# Patient Record
Sex: FEMALE | Race: OTHER | Employment: STUDENT | URBAN - METROPOLITAN AREA
[De-identification: names, ages, dates, MRNs, and addresses within clinical notes are randomized per-mention and may not be internally consistent; named-entity substitution may affect disease eponyms.]

---
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
## 2023-02-09 ENCOUNTER — HOSPITAL ENCOUNTER (EMERGENCY)
Age: 23
Discharge: PSYCHIATRIC OR SUBSTANCE USE FACILITY | End: 2023-02-10
Attending: EMERGENCY MEDICINE
Payer: MEDICAID

## 2023-02-09 DIAGNOSIS — R45.851 SUICIDAL IDEATION: Primary | ICD-10-CM

## 2023-02-09 LAB
ACETAMINOPHEN LEVEL: <5 UG/ML (ref 15–30)
ALBUMIN SERPL-MCNC: 4.3 GM/DL (ref 3.4–5)
ALCOHOL SCREEN SERUM: <0.01 %WT/VOL
ALP BLD-CCNC: 83 IU/L (ref 40–129)
ALT SERPL-CCNC: 52 U/L (ref 10–40)
AMPHETAMINES: NEGATIVE
ANION GAP SERPL CALCULATED.3IONS-SCNC: 13 MMOL/L (ref 4–16)
AST SERPL-CCNC: 36 IU/L (ref 15–37)
BARBITURATE SCREEN URINE: NEGATIVE
BASOPHILS ABSOLUTE: 0 K/CU MM
BASOPHILS RELATIVE PERCENT: 0.5 % (ref 0–1)
BENZODIAZEPINE SCREEN, URINE: NEGATIVE
BILIRUB SERPL-MCNC: 0.3 MG/DL (ref 0–1)
BUN SERPL-MCNC: 13 MG/DL (ref 6–23)
CALCIUM SERPL-MCNC: 9.9 MG/DL (ref 8.3–10.6)
CANNABINOID SCREEN URINE: NEGATIVE
CHLORIDE BLD-SCNC: 104 MMOL/L (ref 99–110)
CO2: 24 MMOL/L (ref 21–32)
COCAINE METABOLITE: NEGATIVE
CREAT SERPL-MCNC: 0.7 MG/DL (ref 0.6–1.1)
DIFFERENTIAL TYPE: ABNORMAL
DOSE AMOUNT: ABNORMAL
DOSE AMOUNT: ABNORMAL
DOSE TIME: ABNORMAL
DOSE TIME: ABNORMAL
EOSINOPHILS ABSOLUTE: 0.3 K/CU MM
EOSINOPHILS RELATIVE PERCENT: 3.8 % (ref 0–3)
GFR SERPL CREATININE-BSD FRML MDRD: >60 ML/MIN/1.73M2
GLUCOSE SERPL-MCNC: 130 MG/DL (ref 70–99)
HCG QUALITATIVE: NEGATIVE
HCT VFR BLD CALC: 47.4 % (ref 37–47)
HEMOGLOBIN: 14.7 GM/DL (ref 12.5–16)
IMMATURE NEUTROPHIL %: 0.4 % (ref 0–0.43)
LYMPHOCYTES ABSOLUTE: 2.9 K/CU MM
LYMPHOCYTES RELATIVE PERCENT: 34.4 % (ref 24–44)
MCH RBC QN AUTO: 27 PG (ref 27–31)
MCHC RBC AUTO-ENTMCNC: 31 % (ref 32–36)
MCV RBC AUTO: 87.1 FL (ref 78–100)
MONOCYTES ABSOLUTE: 0.5 K/CU MM
MONOCYTES RELATIVE PERCENT: 5.7 % (ref 0–4)
NUCLEATED RBC %: 0 %
OPIATES, URINE: NEGATIVE
OXYCODONE: NEGATIVE
PDW BLD-RTO: 13.7 % (ref 11.7–14.9)
PHENCYCLIDINE, URINE: NEGATIVE
PLATELET # BLD: 303 K/CU MM (ref 140–440)
PMV BLD AUTO: 10.6 FL (ref 7.5–11.1)
POTASSIUM SERPL-SCNC: 4.1 MMOL/L (ref 3.5–5.1)
RBC # BLD: 5.44 M/CU MM (ref 4.2–5.4)
SALICYLATE LEVEL: <0.3 MG/DL (ref 15–30)
SEGMENTED NEUTROPHILS ABSOLUTE COUNT: 4.7 K/CU MM
SEGMENTED NEUTROPHILS RELATIVE PERCENT: 55.2 % (ref 36–66)
SODIUM BLD-SCNC: 141 MMOL/L (ref 135–145)
TOTAL IMMATURE NEUTOROPHIL: 0.03 K/CU MM
TOTAL NUCLEATED RBC: 0 K/CU MM
TOTAL PROTEIN: 7.6 GM/DL (ref 6.4–8.2)
WBC # BLD: 8.4 K/CU MM (ref 4–10.5)

## 2023-02-09 PROCEDURE — G0480 DRUG TEST DEF 1-7 CLASSES: HCPCS

## 2023-02-09 PROCEDURE — 80307 DRUG TEST PRSMV CHEM ANLYZR: CPT

## 2023-02-09 PROCEDURE — 87804 INFLUENZA ASSAY W/OPTIC: CPT

## 2023-02-09 PROCEDURE — 84703 CHORIONIC GONADOTROPIN ASSAY: CPT

## 2023-02-09 PROCEDURE — 85025 COMPLETE CBC W/AUTO DIFF WBC: CPT

## 2023-02-09 PROCEDURE — 80053 COMPREHEN METABOLIC PANEL: CPT

## 2023-02-09 PROCEDURE — 99285 EMERGENCY DEPT VISIT HI MDM: CPT

## 2023-02-09 PROCEDURE — 87635 SARS-COV-2 COVID-19 AMP PRB: CPT

## 2023-02-10 VITALS
RESPIRATION RATE: 21 BRPM | BODY MASS INDEX: 50.02 KG/M2 | HEART RATE: 102 BPM | SYSTOLIC BLOOD PRESSURE: 137 MMHG | TEMPERATURE: 98.1 F | HEIGHT: 64 IN | WEIGHT: 293 LBS | OXYGEN SATURATION: 97 % | DIASTOLIC BLOOD PRESSURE: 90 MMHG

## 2023-02-10 LAB
RAPID INFLUENZA  B AGN: NEGATIVE
RAPID INFLUENZA A AGN: NEGATIVE
SARS-COV-2 RDRP RESP QL NAA+PROBE: NOT DETECTED
SOURCE: NORMAL

## 2023-02-10 ASSESSMENT — PAIN - FUNCTIONAL ASSESSMENT: PAIN_FUNCTIONAL_ASSESSMENT: NONE - DENIES PAIN

## 2023-02-10 NOTE — ED PROVIDER NOTES
Emergency Department Encounter  [facility] EMERGENCY DEPARTMENT    Patient: Ivon Ely  MRN: [id number]  : 2000  Date of Evaluation: 2023  ED Provider: Janis Chan PA-C    Chief Complaint       Chief Complaint   Patient presents with    Suicidal       Kake     Ivon Ely is a 23 y.o. female who presents to the emergency department for hallucinations, paranoia, suicidal ideation.  Patient states she stopped taking her oral Abilify about 10 days ago because she doesn't like to take pills and doesn't like the side effects.  She states she did much better when she was on IM Abilify but was taken off that when she moved to [place] a couple years ago.  She states she believes people are following her and out to get her.  She has a plan to drive to the middle of [place] and overdose on all of her pills.  Denies any homicidal ideation.  Denies drug or alcohol use.       ROS     CONSTITUTIONAL:  Denies fever.  EYES:  Denies visual changes.  HEAD:  Denies headache.  ENT:  Denies earache, nasal congestion, sore throat.  NECK:  Denies neck pain.  RESPIRATORY:  Denies any shortness of breath.  CARDIOVASCULAR:  Denies chest pain.  GI:  Denies nausea or vomiting.    :  Denies urinary symptoms.  MUSCULOSKELETAL:  Denies extremity pain or swelling.  BACK:  Denies back pain.  INTEGUMENT:  Denies skin changes.  LYMPHATIC:  Denies lymphadenopathy.  NEUROLOGIC:  Denies any numbness/tingling.  PSYCHIATRIC:  + hallucinations, paranoia, SI    Past History     Past Medical History:   Diagnosis Date    Depression      History reviewed. No pertinent surgical history.  Social History     Socioeconomic History    Marital status: Single     Spouse name: None    Number of children: None    Years of education: None    Highest education level: None   Tobacco Use    Smoking status: Never    Smokeless tobacco: Never   Substance and Sexual Activity    Alcohol use: No    Drug  use: No    Sexual activity: Never       Medications/Allergies     Discharge Medication List as of 2/10/2023  8:35 AM        CONTINUE these medications which have NOT CHANGED    Details   sertraline (ZOLOFT) 50 MG tablet Take 50 mg by mouth dailyHistorical Med      ARIPiprazole (ABILIFY) 5 MG tablet Take 10 mg by mouth dailyHistorical Med           No Known Allergies     Physical Exam       ED Triage Vitals [02/09/23 2114]   BP Temp Temp src Heart Rate Resp SpO2 Height Weight   (!) 156/90 98.2 °F (36.8 °C) -- (!) 128 (!) 32 98 % -- --     GENERAL APPEARANCE:  Well-developed, well-nourished, no acute distress. HEAD:  NC/AT. EYES:  Sclera anicteric. ENT:  Ears, nose, mouth normal.     NECK:  Supple. CARDIO:  RRR. LUNGS:   CTAB. Respirations unlabored. ABDOMEN:  Soft, non-distended, non-tender. BS active. BACK:  No midline thoracic or lumbar spinal tenderness. EXTREMITIES:  No acute deformities. SKIN:  Warm and dry. NEUROLOGICAL:  Alert and oriented. PSYCHIATRIC:  Normal mood. Diagnostics     Labs:  Results for orders placed or performed during the hospital encounter of 02/09/23   COVID-19, Rapid    Specimen: Nasopharyngeal   Result Value Ref Range    Source UNKNOWN     SARS-CoV-2, NAAT NOT DETECTED NOT DETECTED   Rapid Flu Swab    Specimen: Nasopharyngeal   Result Value Ref Range    Rapid Influenza A Ag NEGATIVE NEGATIVE    Rapid Influenza B Ag NEGATIVE NEGATIVE   Ethanol   Result Value Ref Range    Alcohol Scrn <0.01 <8.00 %WT/VOL   Salicylate   Result Value Ref Range    Salicylate Lvl <8.0 (L) 15 - 30 MG/DL    DOSE AMOUNT DOSE AMT. GIVEN - Unknown     DOSE TIME DOSE TIME GIVEN - Unknown    Acetaminophen Level   Result Value Ref Range    Acetaminophen Level <5.0 (L) 15 - 30 ug/ml    DOSE AMOUNT DOSE AMT.  GIVEN - UNKNOWN     DOSE TIME DOSE TIME GIVEN - UNKNOWN    Urine Drug Screen   Result Value Ref Range    Cannabinoid Scrn, Ur NEGATIVE NEGATIVE    Amphetamines NEGATIVE NEGATIVE    Cocaine Metabolite NEGATIVE NEGATIVE    Benzodiazepine Screen, Urine NEGATIVE NEGATIVE    Barbiturate Screen, Ur NEGATIVE NEGATIVE    Opiates, Urine NEGATIVE NEGATIVE    Phencyclidine, Urine NEGATIVE NEGATIVE    Oxycodone NEGATIVE NEGATIVE   CBC with Auto Differential   Result Value Ref Range    WBC 8.4 4.0 - 10.5 K/CU MM    RBC 5.44 (H) 4.2 - 5.4 M/CU MM    Hemoglobin 14.7 12.5 - 16.0 GM/DL    Hematocrit 47.4 (H) 37 - 47 %    MCV 87.1 78 - 100 FL    MCH 27.0 27 - 31 PG    MCHC 31.0 (L) 32.0 - 36.0 %    RDW 13.7 11.7 - 14.9 %    Platelets 083 094 - 219 K/CU MM    MPV 10.6 7.5 - 11.1 FL    Differential Type AUTOMATED DIFFERENTIAL     Segs Relative 55.2 36 - 66 %    Lymphocytes % 34.4 24 - 44 %    Monocytes % 5.7 (H) 0 - 4 %    Eosinophils % 3.8 (H) 0 - 3 %    Basophils % 0.5 0 - 1 %    Segs Absolute 4.7 K/CU MM    Lymphocytes Absolute 2.9 K/CU MM    Monocytes Absolute 0.5 K/CU MM    Eosinophils Absolute 0.3 K/CU MM    Basophils Absolute 0.0 K/CU MM    Nucleated RBC % 0.0 %    Total Nucleated RBC 0.0 K/CU MM    Total Immature Neutrophil 0.03 K/CU MM    Immature Neutrophil % 0.4 0 - 0.43 %   Comprehensive Metabolic Panel w/ Reflex to MG   Result Value Ref Range    Sodium 141 135 - 145 MMOL/L    Potassium 4.1 3.5 - 5.1 MMOL/L    Chloride 104 99 - 110 mMol/L    CO2 24 21 - 32 MMOL/L    BUN 13 6 - 23 MG/DL    Creatinine 0.7 0.6 - 1.1 MG/DL    Est, Glom Filt Rate >60 >60 mL/min/1.73m2    Glucose 130 (H) 70 - 99 MG/DL    Calcium 9.9 8.3 - 10.6 MG/DL    Albumin 4.3 3.4 - 5.0 GM/DL    Total Protein 7.6 6.4 - 8.2 GM/DL    Total Bilirubin 0.3 0.0 - 1.0 MG/DL    ALT 52 (H) 10 - 40 U/L    AST 36 15 - 37 IU/L    Alkaline Phosphatase 83 40 - 129 IU/L    Anion Gap 13 4 - 16   HCG Qualitative, Serum   Result Value Ref Range    hCG Qual NEGATIVE        Radiographs:  No results found. Procedures/EKG:   Please see attending physician's note for interpretation.     ED Course and MDM     Chief Complaint/HPI Summary/Differential Diagnosis: Patient presents to the ED with chief complaint of hallucinations, paranoia, and suicidal ideation. Patient seen and evaluated. Triage and nursing notes reviewed and incorporated. History from : Patient    Limitations to history : None    Patient was given the following medications:  Medications - No data to display    Independent Imaging Interpretation by me:  None    EKG (if obtained):  Please see supervising physician's note for interpretation. Chronic conditions affecting care: Depression    Discussion with Other Profesionals : Consultant Dr. Neena Germain, psychiatry    Social Determinants : None    Records Reviewed : Inpatient Notes from similar encounters    ED Course/Reassessment/Disposition:  Patient seen and examined. Screening labs obtained. Results were discussed with patient--she is medically cleared. Consult placed to psychiatry. Patient evaluated by Dr. Neena Germain who recommends inpatient management. Pending placement at the end of my shift. Signed out to Dr. Yefri Angel. Disposition Considerations (tests considered but not done, Shared Decision Making, Patient Expectation of Test or Treatment):   Psychiatry recommends inpatient placement. I am the Primary Clinician of Record. Supervising physician was Dr. Yefri Angel. Patient signed out to him at the end of my shift. In light of current events, I did utilize appropriate PPE (including N95 and surgical face mask, safety glasses, and gloves, as recommended by the health facility/national standard best practice, during my bedside interactions with the patient. Final Impression      1.  Suicidal ideation          DISPOSITION Decision To Transfer 02/10/2023 05:00:02 AM      [address]an[address], PA-C   Ac[facility]        [address] [place], [place]  02/10/23 7216

## 2023-02-10 NOTE — ED NOTES
Report called to [facility].      [facility]  02/10/23 3593

## 2023-02-10 NOTE — ED NOTES
Patient resting in position of comfort, 1:1 sitter at bedside with patient.         Sean Dunlap RN  02/09/23 6248
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
## 2023-02-10 NOTE — ED NOTES
Pt updated on plan of care, awaiting lab results, denies needs, 1:1 sitter at bedside.         Franco Kat RN  02/09/23 5600

## 2023-02-10 NOTE — ED NOTES
Report received from [facility]. Care assumed at this time.       [facility]  02/10/23 3531

## 2023-02-10 NOTE — ED NOTES
Patient arrives to ED  for suicidal ideations that have gone on for the last month and have been increasing in the last few days. Patient states \" my plan is just to take a bunch of pills. \"  Patient is alert and oriented at time of arrival.     Tawanda Hutchinson RN  02/09/23 8024

## 2023-02-10 NOTE — ED PROVIDER NOTES
CARE RECEIVED FROM: GAYE Chan  I reviewed the tyson elements of the history, physical exam and initial treatment plan at the bedside. ANCILLARY DATA:  I reviewed the images. Radiologist interpretation:   No orders to display     Labs Reviewed   SALICYLATE LEVEL - Abnormal; Notable for the following components:       Result Value    Salicylate Lvl <9.2 (*)     All other components within normal limits   ACETAMINOPHEN LEVEL - Abnormal; Notable for the following components:    Acetaminophen Level <5.0 (*)     All other components within normal limits   CBC WITH AUTO DIFFERENTIAL - Abnormal; Notable for the following components:    RBC 5.44 (*)     Hematocrit 47.4 (*)     MCHC 31.0 (*)     Monocytes % 5.7 (*)     Eosinophils % 3.8 (*)     All other components within normal limits   COMPREHENSIVE METABOLIC PANEL W/ REFLEX TO MG FOR LOW K - Abnormal; Notable for the following components:    Glucose 130 (*)     ALT 52 (*)     All other components within normal limits   COVID-19, RAPID   RAPID INFLUENZA A/B ANTIGENS   ETHANOL   URINE DRUG SCREEN   HCG, SERUM, QUALITATIVE     MEDICAL DECISION MAKING / PLAN:    This is a 32-year-old female who presented the emergency department complaints of paranoia and suicidal ideation with plans to drive [place] the [place] of nowhere and overdose on pills. It seems that she stopped taking her Abilify that was prescribed to her. Her medical screening exam was reassuring here and patient medically cleared. She was evaluated by psychiatry with recommendations for inpatient psychiatric care. At time of signout patient is awaiting psych placement. [facility]. FINAL IMPRESSION:  1. Suicidal ideation      ? Electronically signed by:  [address], DO, 2/10/2023        [address], DO  02/10/23 0500

## 2023-02-10 NOTE — ED NOTES
Pt accepted by  at [facility]  9th floor  [id number] [phone]  Can come after [phone]     Lety Miguel  02/10/23 5564

## 2023-02-10 NOTE — ED NOTES
[facility] at bedside for transport.       [facility]  02/10/23 0287

## 2023-02-10 NOTE — ED NOTES
1:1 sitter at bedside, jorge a reports feeling suicidal states that she wanted to overdose, patient ambulated to restroom with 2 staff members to provide urine sample.    Patient consented to labs and urine as well as covid and flu        Gareth Fuchs RN  02/09/23 3121

## 2024-05-07 ENCOUNTER — HOSPITAL ENCOUNTER (OUTPATIENT)
Age: 24
Discharge: HOME | End: 2024-05-07
Payer: MEDICAID

## 2024-05-07 ENCOUNTER — OFFICE VISIT (OUTPATIENT)
Dept: BARIATRICS/WEIGHT MGMT | Age: 24
End: 2024-05-07
Payer: MEDICAID

## 2024-05-07 VITALS
WEIGHT: 293 LBS | SYSTOLIC BLOOD PRESSURE: 130 MMHG | DIASTOLIC BLOOD PRESSURE: 80 MMHG | OXYGEN SATURATION: 97 % | HEIGHT: 65 IN | HEART RATE: 90 BPM | BODY MASS INDEX: 48.82 KG/M2

## 2024-05-07 DIAGNOSIS — Z01.818 PRE-OPERATIVE CLEARANCE: ICD-10-CM

## 2024-05-07 DIAGNOSIS — Z79.899 MEDICATION MANAGEMENT: Primary | ICD-10-CM

## 2024-05-07 DIAGNOSIS — Z01.818 PRE-OPERATIVE CLEARANCE: Primary | ICD-10-CM

## 2024-05-07 DIAGNOSIS — E66.01 MORBID OBESITY DUE TO EXCESS CALORIES (HCC): ICD-10-CM

## 2024-05-07 DIAGNOSIS — Z79.899 MEDICATION MANAGEMENT: ICD-10-CM

## 2024-05-07 LAB
25(OH)D3 SERPL-MCNC: 14.03 NG/ML
ALBUMIN SERPL-MCNC: 4 GM/DL (ref 3.4–5)
ALP BLD-CCNC: 88 IU/L (ref 40–129)
ALT SERPL-CCNC: 25 U/L (ref 10–40)
ANION GAP SERPL CALCULATED.3IONS-SCNC: 12 MMOL/L (ref 7–16)
AST SERPL-CCNC: 17 IU/L (ref 15–37)
BASOPHILS ABSOLUTE: 0 K/CU MM
BASOPHILS RELATIVE PERCENT: 0.4 % (ref 0–1)
BILIRUB SERPL-MCNC: 0.4 MG/DL (ref 0–1)
BUN SERPL-MCNC: 15 MG/DL (ref 6–23)
CALCIUM SERPL-MCNC: 8.8 MG/DL (ref 8.3–10.6)
CHLORIDE BLD-SCNC: 106 MMOL/L (ref 99–110)
CHOLEST SERPL-MCNC: 182 MG/DL
CO2: 23 MMOL/L (ref 21–32)
CREAT SERPL-MCNC: 0.6 MG/DL (ref 0.6–1.1)
DIFFERENTIAL TYPE: ABNORMAL
EKG ATRIAL RATE: 83 BPM
EKG DIAGNOSIS: NORMAL
EKG P AXIS: 70 DEGREES
EKG P-R INTERVAL: 168 MS
EKG Q-T INTERVAL: 354 MS
EKG QRS DURATION: 70 MS
EKG QTC CALCULATION (BAZETT): 415 MS
EKG R AXIS: 82 DEGREES
EKG T AXIS: 49 DEGREES
EKG VENTRICULAR RATE: 83 BPM
EOSINOPHILS ABSOLUTE: 0.4 K/CU MM
EOSINOPHILS RELATIVE PERCENT: 5.3 % (ref 0–3)
ESTIMATED AVERAGE GLUCOSE: 105 MG/DL
FOLATE SERPL-MCNC: 11.1 NG/ML (ref 3.1–17.5)
GFR, ESTIMATED: >90 ML/MIN/1.73M2
GLUCOSE SERPL-MCNC: 86 MG/DL (ref 70–99)
HBA1C MFR BLD: 5.3 % (ref 4.2–6.3)
HCT VFR BLD CALC: 44.7 % (ref 37–47)
HDLC SERPL-MCNC: 38 MG/DL
HEMOGLOBIN: 14.2 GM/DL (ref 12.5–16)
IMMATURE NEUTROPHIL %: 0.3 % (ref 0–0.43)
IRON: 77 UG/DL (ref 37–145)
LDLC SERPL CALC-MCNC: 102 MG/DL
LYMPHOCYTES ABSOLUTE: 2.5 K/CU MM
LYMPHOCYTES RELATIVE PERCENT: 31.6 % (ref 24–44)
MAGNESIUM: 2.1 MG/DL (ref 1.8–2.4)
MCH RBC QN AUTO: 28.1 PG (ref 27–31)
MCHC RBC AUTO-ENTMCNC: 31.8 % (ref 32–36)
MCV RBC AUTO: 88.5 FL (ref 78–100)
MONOCYTES ABSOLUTE: 0.4 K/CU MM
MONOCYTES RELATIVE PERCENT: 5.6 % (ref 0–4)
NEUTROPHILS ABSOLUTE: 4.5 K/CU MM
NEUTROPHILS RELATIVE PERCENT: 56.8 % (ref 36–66)
NUCLEATED RBC %: 0 %
PCT TRANSFERRIN: 22 % (ref 10–44)
PDW BLD-RTO: 13.3 % (ref 11.7–14.9)
PLATELET # BLD: 257 K/CU MM (ref 140–440)
PMV BLD AUTO: 11.1 FL (ref 7.5–11.1)
POTASSIUM SERPL-SCNC: 4.5 MMOL/L (ref 3.5–5.1)
RBC # BLD: 5.05 M/CU MM (ref 4.2–5.4)
SODIUM BLD-SCNC: 141 MMOL/L (ref 135–145)
TOTAL IMMATURE NEUTOROPHIL: 0.02 K/CU MM
TOTAL IRON BINDING CAPACITY: 353 UG/DL (ref 250–450)
TOTAL NUCLEATED RBC: 0 K/CU MM
TOTAL PROTEIN: 6.6 GM/DL (ref 6.4–8.2)
TRIGL SERPL-MCNC: 208 MG/DL
TSH SERPL DL<=0.005 MIU/L-ACNC: 2.68 UIU/ML (ref 0.27–4.2)
UNSATURATED IRON BINDING CAPACITY: 276 UG/DL (ref 110–370)
VITAMIN B-12: 266.3 PG/ML (ref 211–911)
WBC # BLD: 7.9 K/CU MM (ref 4–10.5)

## 2024-05-07 PROCEDURE — 83735 ASSAY OF MAGNESIUM: CPT

## 2024-05-07 PROCEDURE — 82306 VITAMIN D 25 HYDROXY: CPT

## 2024-05-07 PROCEDURE — 82746 ASSAY OF FOLIC ACID SERUM: CPT

## 2024-05-07 PROCEDURE — 84630 ASSAY OF ZINC: CPT

## 2024-05-07 PROCEDURE — 99204 OFFICE O/P NEW MOD 45 MIN: CPT | Performed by: SURGERY

## 2024-05-07 PROCEDURE — 93005 ELECTROCARDIOGRAM TRACING: CPT | Performed by: SURGERY

## 2024-05-07 PROCEDURE — 85025 COMPLETE CBC W/AUTO DIFF WBC: CPT

## 2024-05-07 PROCEDURE — 93010 ELECTROCARDIOGRAM REPORT: CPT | Performed by: INTERNAL MEDICINE

## 2024-05-07 PROCEDURE — 84443 ASSAY THYROID STIM HORMONE: CPT

## 2024-05-07 PROCEDURE — 82607 VITAMIN B-12: CPT

## 2024-05-07 PROCEDURE — 80061 LIPID PANEL: CPT

## 2024-05-07 PROCEDURE — 81025 URINE PREGNANCY TEST: CPT | Performed by: SURGERY

## 2024-05-07 PROCEDURE — 36415 COLL VENOUS BLD VENIPUNCTURE: CPT

## 2024-05-07 PROCEDURE — 80053 COMPREHEN METABOLIC PANEL: CPT

## 2024-05-07 PROCEDURE — 83036 HEMOGLOBIN GLYCOSYLATED A1C: CPT

## 2024-05-07 PROCEDURE — 83970 ASSAY OF PARATHORMONE: CPT

## 2024-05-07 PROCEDURE — 83550 IRON BINDING TEST: CPT

## 2024-05-07 PROCEDURE — 83540 ASSAY OF IRON: CPT

## 2024-05-07 ASSESSMENT — ENCOUNTER SYMPTOMS
GASTROINTESTINAL NEGATIVE: 1
BACK PAIN: 1
SHORTNESS OF BREATH: 1
ALLERGIC/IMMUNOLOGIC NEGATIVE: 1
EYES NEGATIVE: 1

## 2024-05-07 NOTE — PROGRESS NOTES
Initial Bariatric Surgery Consultation History and Physical    Chief Complaint: Obesity Body mass index is 69.56 kg/m².    History of Present Illness:  The patient is a 24 y.o. female being seen today for initial bariatric surgery consultation.    The patient previously attended a bariatric surgery informational seminar or received electronic version of presentation.    The patient's PCP is doesn't have one.    The patient first recognized having issues with increased weight approximately \"my whole life\" years ago.     The patient identifies the following precipitants causing, or contributing to, weight gain: starting medications for bipolar    The patient estimates the lowest weight in the past five years is approximately 290 pounds.    The patient estimates the highest weight in the past five years is approximately 418 pounds.    The patient's current weight is 418 pounds, while the current BMI is Body mass index is 69.56 kg/m².    The patient has not had previous bariatric surgery.    The patient has tried the following diet(s): water fasting, multiple diets    The patient has tried the following over-the-counter drugs and/or prescription weight loss medications: hydroxycut    The patient has tried the following physical activities or exercises: denies    The patient was mostly unsuccessful with previous dietary, medication, and/or physical activity for sustained weight loss.    The patient reports current level of commitment to weight loss as: 100 percent    In reviewing the patient's typical daily diet, it consists of the following:   Breakfast: breakfast burrito from [employer]'s   Snack: denies   Lunch: ramen   Snack: denies   Dinner: fried meat with sauce and rice   Snack: ramen    The patient reports drinking the following beverages throughout the day: water, soda, flavored water packets    The patient reports eating outside of the house approximately 1 times per week at either fast food or sit down

## 2024-05-08 LAB — PTH-INTACT SERPL-MCNC: 44 PG/ML (ref 15–65)

## 2024-05-09 LAB — ZINC SERPL-MCNC: 75.7 UG/DL (ref 60–120)

## 2024-06-06 ENCOUNTER — OFFICE VISIT (OUTPATIENT)
Dept: BARIATRICS/WEIGHT MGMT | Age: 24
End: 2024-06-06

## 2024-06-06 VITALS
DIASTOLIC BLOOD PRESSURE: 72 MMHG | SYSTOLIC BLOOD PRESSURE: 128 MMHG | BODY MASS INDEX: 48.82 KG/M2 | HEIGHT: 65 IN | HEART RATE: 101 BPM | WEIGHT: 293 LBS | OXYGEN SATURATION: 97 %

## 2024-06-06 VITALS — BODY MASS INDEX: 48.82 KG/M2 | HEIGHT: 65 IN | WEIGHT: 293 LBS

## 2024-06-06 DIAGNOSIS — E66.01 CLASS 3 SEVERE OBESITY DUE TO EXCESS CALORIES WITH BODY MASS INDEX (BMI) OF 60.0 TO 69.9 IN ADULT, UNSPECIFIED WHETHER SERIOUS COMORBIDITY PRESENT (HCC): Primary | ICD-10-CM

## 2024-06-06 DIAGNOSIS — E66.01 MORBID OBESITY WITH BMI OF 60.0-69.9, ADULT (HCC): Primary | ICD-10-CM

## 2024-06-06 PROCEDURE — NBSRV NON-BILLABLE SERVICE: Performed by: SURGERY

## 2024-06-06 NOTE — PROGRESS NOTES
Obesity   Problem: Increased adiposity compared to reference standard or established norms   Etiology: Excess intake compared to output over time   S/S: Ht: 5' 5\" Wt: 416 lb 10.7 oz BMI: 69.34    NUTRITION INTERVENTIONS:    Individualized treatment goals to address nutrition diagnosis:   Instructed on 1200 kcal diet for weight loss   Provided sample menus, healthy foods list, plate models and meal planning handouts   Encouraged record keeping and physical activity as approved by physician    MONITORING/ EVALUATION/ PLAN:   Pt verbalized understanding of all materials covered   Pt asked pertinent questions throughout the session - expect compliance with nutrition guidelines presented   Provided pt with contact information should questions arise prior to next visit   Will f/u with pt in 2-3 mos for further education and post-op diet instructions    Hernandez Carter, RD, LD

## 2024-06-06 NOTE — PROGRESS NOTES
Ivon Ely  2000  24 y.o.    SUBJECT MAGDI:  HPI: Ivon Ely presents today for Initial Surgical Weight Loss Class instructed by this provider and registered dietician. Patient had an initial individual consult with Dr. Mendenhall for surgical weight loss management and agrees to follow the program guidelines.      No chief complaint on file.   Education Class  Past Medical History:   Diagnosis Date    Depression      No past surgical history on file.  No current outpatient medications on file.     No current facility-administered medications for this visit.     No family history on file.  No Known Allergies     OBJECTIVE:     /72 (Site: Left Lower Arm, Position: Sitting, Cuff Size: Medium Adult)   Pulse (!) 101   Ht 1.651 m (5' 5\")   Wt (!) 189 kg (416 lb 11.2 oz)   SpO2 97%   BMI 69.34 kg/m²   Wt Readings from Last 3 Encounters:   06/06/24 (!) 189 kg (416 lb 11.2 oz)   05/07/24 (!) 189.6 kg (418 lb)   02/10/23 (!) 158.8 kg (350 lb)       - Patient attended the Surgical Weight Loss Group class today.     - Instructed on risks of obesity, criteria for surgical intervention, advantages of weight loss surgery and the program requirements.    - Also instructed on mindful eating, label reading, calorie counting, healthy food choices, and goal setting.    - Patient was provided a foods list, meal plan, and goal card.    - Patient asked pertinent questions throughout session and answers given.    - Patient voiced understanding of all information provided.    - Patient is to refer to surgical handbook or to call office if questions arise.     - Goal card signed.    Patient was seen with total face to face time of 40 minutes in a group setting. More than 50% of this visit was counseling on obesity, strict diet recommendations, nutrition and education as above in my assessment and plan section of my note.

## 2024-06-18 ENCOUNTER — TELEPHONE (OUTPATIENT)
Dept: CARDIOLOGY CLINIC | Age: 24
End: 2024-06-18

## 2024-06-18 ENCOUNTER — OFFICE VISIT (OUTPATIENT)
Dept: BARIATRICS/WEIGHT MGMT | Age: 24
End: 2024-06-18
Payer: MEDICAID

## 2024-06-18 VITALS
DIASTOLIC BLOOD PRESSURE: 62 MMHG | HEIGHT: 65 IN | WEIGHT: 293 LBS | SYSTOLIC BLOOD PRESSURE: 124 MMHG | HEART RATE: 90 BPM | OXYGEN SATURATION: 98 % | BODY MASS INDEX: 48.82 KG/M2

## 2024-06-18 DIAGNOSIS — Z01.818 PRE-OPERATIVE CLEARANCE: ICD-10-CM

## 2024-06-18 DIAGNOSIS — Z79.899 MEDICATION MANAGEMENT: ICD-10-CM

## 2024-06-18 DIAGNOSIS — E66.01 CLASS 3 SEVERE OBESITY DUE TO EXCESS CALORIES WITH BODY MASS INDEX (BMI) OF 60.0 TO 69.9 IN ADULT, UNSPECIFIED WHETHER SERIOUS COMORBIDITY PRESENT (HCC): Primary | ICD-10-CM

## 2024-06-18 PROCEDURE — 99213 OFFICE O/P EST LOW 20 MIN: CPT | Performed by: NURSE PRACTITIONER

## 2024-06-18 RX ORDER — PHENTERMINE HYDROCHLORIDE 37.5 MG/1
37.5 TABLET ORAL
Qty: 30 TABLET | Refills: 0 | Status: SHIPPED | OUTPATIENT
Start: 2024-06-18 | End: 2024-07-18

## 2024-06-18 ASSESSMENT — ENCOUNTER SYMPTOMS
CHEST TIGHTNESS: 0
BACK PAIN: 0
PHOTOPHOBIA: 0
APNEA: 0
NAUSEA: 0
DIARRHEA: 0
COLOR CHANGE: 0
SHORTNESS OF BREATH: 0
WHEEZING: 0
SORE THROAT: 0

## 2024-06-21 ENCOUNTER — TELEPHONE (OUTPATIENT)
Dept: BARIATRICS/WEIGHT MGMT | Age: 24
End: 2024-06-21

## 2024-06-21 NOTE — TELEPHONE ENCOUNTER
PT called stating that [facility] on [address] does not have her medication and  to [facility]. I called [facility] [address] they have the Adipex in stock the PT just needs to call to inform them to get it ready.       LVM for PT with these instructions.

## 2024-06-21 NOTE — TELEPHONE ENCOUNTER
Spoke Ivon regarding her Adipex at [facility] [employer] and that when she comes in next she  will need to change pharmacies as they are closing

## 2024-07-17 ENCOUNTER — OFFICE VISIT (OUTPATIENT)
Dept: BARIATRICS/WEIGHT MGMT | Age: 24
End: 2024-07-17
Payer: MEDICAID

## 2024-07-17 VITALS
HEIGHT: 65 IN | BODY MASS INDEX: 48.82 KG/M2 | HEART RATE: 94 BPM | WEIGHT: 293 LBS | DIASTOLIC BLOOD PRESSURE: 80 MMHG | OXYGEN SATURATION: 94 % | SYSTOLIC BLOOD PRESSURE: 110 MMHG

## 2024-07-17 DIAGNOSIS — E78.1 HIGH TRIGLYCERIDES: ICD-10-CM

## 2024-07-17 DIAGNOSIS — E66.01 CLASS 3 SEVERE OBESITY DUE TO EXCESS CALORIES WITH BODY MASS INDEX (BMI) OF 60.0 TO 69.9 IN ADULT, UNSPECIFIED WHETHER SERIOUS COMORBIDITY PRESENT (HCC): Primary | ICD-10-CM

## 2024-07-17 DIAGNOSIS — Z79.899 MEDICATION MANAGEMENT: ICD-10-CM

## 2024-07-17 DIAGNOSIS — Z01.818 PRE-OP EVALUATION: ICD-10-CM

## 2024-07-17 PROCEDURE — 99214 OFFICE O/P EST MOD 30 MIN: CPT | Performed by: NURSE PRACTITIONER

## 2024-07-17 RX ORDER — PHENTERMINE HYDROCHLORIDE 37.5 MG/1
37.5 TABLET ORAL
Qty: 30 TABLET | Refills: 0 | Status: SHIPPED | OUTPATIENT
Start: 2024-07-17 | End: 2024-08-16

## 2024-07-17 NOTE — PROGRESS NOTES
Adipex    - phentermine (ADIPEX-P) 37.5 MG tablet; Take 1 tablet by mouth every morning (before breakfast) for 30 days. BMI 44. Max Daily Amount: 37.5 mg  Dispense: 30 tablet; Refill: 0    4. Pre-op evaluation  - Continue to work on clearances- make appts   - More weight loss by next appt   - RTC one month      This patient is a female of reproductive age and was advised she should not become pregnant during the bariatric workup process and for at least 12-18 months after surgery. The patient is agreeable to this plan.    I spent 25 minutes with the patient face to face today and over 50% of the office visit today was spent in face to face counseling regarding diet and exercise, in preparation for her planned Robotic Sleeve Gastrectomy.    Discussed in length complying with the dietary recommendations, complying with the preoperative workup including dietary counseling, exercise physiologist counseling, and pre-operative optimization of pulmonologist and cardiologist.    The patient expressed understanding and willingness to comply nicely; all questions and concerns addressed.    Orders Placed This Encounter   Medications    phentermine (ADIPEX-P) 37.5 MG tablet     Sig: Take 1 tablet by mouth every morning (before breakfast) for 30 days. BMI 44. Max Daily Amount: 37.5 mg     Dispense:  30 tablet     Refill:  0     No orders of the defined types were placed in this encounter.      Follow Up:  Return in about 1 month (around 8/17/2024).    PAYTON Burgos - CNP

## 2024-07-24 ENCOUNTER — TELEPHONE (OUTPATIENT)
Dept: CARDIOLOGY CLINIC | Age: 24
End: 2024-07-24

## 2024-07-24 NOTE — TELEPHONE ENCOUNTER
Second attempt to call patient and to schedule a consult visit with first available provider. Cardiology referral by Dr. Toribio.

## 2025-01-30 NOTE — ED NOTES
Addended by: RENETTA SINGH on: 1/30/2025 12:04 PM     Modules accepted: Orders     Patient placed into a green gown all labs and specimens collected and sent to core lab. Security to collect belongings.       Álvrao Reynaga RN  02/09/23 4095

## 2025-07-22 NOTE — PROGRESS NOTES
BARIATRIC SURGERY OFFICE PROGRESS NOTE    SUBJECTIVE:    Patient presenting today referred from No primary care provider on file., for   Chief Complaint   Patient presents with    Weight Management     2nd Surg Wm Visit -Card, -pulm, -Psych  Has Bar Cov   .    Vitals:    06/18/24 0918   BP: 124/62   Pulse: 90   SpO2: 98%        BMI: Body mass index is 70.22 kg/m².     Weight History:   Wt Readings from Last 3 Encounters:   06/18/24 (!) 191.4 kg (422 lb)   06/06/24 (!) 189 kg (416 lb 10.7 oz)   06/06/24 (!) 189 kg (416 lb 11.2 oz)         HPI:Ivon Ely is a 24 y.o. female presenting in second bariatric PRE-OP visit    Diet Recall:    Total weight loss/gain: +5.3 lbs since last visit, and +5.3 lbs since starting program     Calories: Has not been tracking calories due to getting food from food pantry. Did start on Friday because she got her first paycheck. States she isnt getting great foods from the pantry. Did buy a food scale  Water Intake: drinks two large chinmay cups a day  Exercise: not exercising    New health problems: Denies  New medications:Denies    Clearances:  -- Cardiology: referral sent  -- Pulmonology: referral sent  -- Psychology: referral sent    EKG completed. NSR    Thoroughly reviewed the patient's medical history, family history, social history and review of systems with the patient today in the office.  Please see medical record for pertinent positives.      Past Medical History:   Diagnosis Date    Depression         There is no problem list on file for this patient.      No past surgical history on file.    No current outpatient medications on file.     No current facility-administered medications for this visit.        No Known Allergies      Review of Systems   Constitutional:  Negative for fatigue and fever.   HENT:  Negative for congestion, dental problem and sore throat.    Eyes:  Negative for photophobia and visual disturbance.   Respiratory:  Negative for apnea, chest  Detail Level: Simple Instructions: This plan will send the code FBSE to the PM system.  DO NOT or CHANGE the price. Price (Do Not Change): 0.00

## 2025-08-20 ENCOUNTER — OFFICE VISIT (OUTPATIENT)
Dept: BARIATRICS/WEIGHT MGMT | Age: 25
End: 2025-08-20
Payer: MEDICAID

## 2025-08-20 VITALS
HEART RATE: 78 BPM | DIASTOLIC BLOOD PRESSURE: 70 MMHG | BODY MASS INDEX: 48.82 KG/M2 | WEIGHT: 293 LBS | HEIGHT: 65 IN | OXYGEN SATURATION: 96 % | SYSTOLIC BLOOD PRESSURE: 120 MMHG

## 2025-08-20 DIAGNOSIS — E66.01 MORBID OBESITY WITH BODY MASS INDEX (BMI) GREATER THAN OR EQUAL TO 70 IN ADULT (HCC): Primary | ICD-10-CM

## 2025-08-20 DIAGNOSIS — Z79.899 MEDICATION MANAGEMENT: ICD-10-CM

## 2025-08-20 DIAGNOSIS — Z01.818 PRE-OP EVALUATION: ICD-10-CM

## 2025-08-20 LAB
ALCOHOL URINE: NORMAL
AMPHETAMINE SCREEN URINE: NORMAL
BARBITURATE SCREEN URINE: NEGATIVE
BENZODIAZEPINE SCREEN, URINE: NEGATIVE
BUPRENORPHINE URINE: NEGATIVE
COCAINE METABOLITE SCREEN URINE: NEGATIVE
CONTROL: NORMAL
FENTANYL SCREEN, URINE: NORMAL
GABAPENTIN SCREEN, URINE: NORMAL
MDMA, URINE: NEGATIVE
METHADONE SCREEN, URINE: NEGATIVE
METHAMPHETAMINE, URINE: NEGATIVE
OPIATE SCREEN URINE: NEGATIVE
OXYCODONE SCREEN URINE: NEGATIVE
PHENCYCLIDINE SCREEN URINE: NEGATIVE
PREGNANCY TEST URINE, POC: NEGATIVE
PROPOXYPHENE SCREEN, URINE: NEGATIVE
SYNTHETIC CANNABINOIDS(K2) SCREEN, URINE: NORMAL
THC SCREEN, URINE: NEGATIVE
TRAMADOL SCREEN URINE: NORMAL
TRICYCLIC ANTIDEPRESSANTS, UR: NEGATIVE

## 2025-08-20 PROCEDURE — 81025 URINE PREGNANCY TEST: CPT | Performed by: NURSE PRACTITIONER

## 2025-08-20 PROCEDURE — 99213 OFFICE O/P EST LOW 20 MIN: CPT | Performed by: NURSE PRACTITIONER

## 2025-08-20 PROCEDURE — 36415 COLL VENOUS BLD VENIPUNCTURE: CPT | Performed by: NURSE PRACTITIONER

## 2025-08-20 PROCEDURE — 80305 DRUG TEST PRSMV DIR OPT OBS: CPT | Performed by: NURSE PRACTITIONER

## 2025-08-21 LAB
25(OH)D3 SERPL-MCNC: 13.4 NG/ML
ALBUMIN SERPL-MCNC: 4.1 G/DL (ref 3.4–5)
ALBUMIN/GLOB SERPL: 1.6 {RATIO} (ref 1.1–2.2)
ALP SERPL-CCNC: 77 U/L (ref 40–129)
ALT SERPL-CCNC: 35 U/L (ref 10–40)
ANION GAP SERPL CALCULATED.3IONS-SCNC: 11 MMOL/L (ref 3–16)
AST SERPL-CCNC: 25 U/L (ref 15–37)
BASOPHILS # BLD: 0.1 K/UL (ref 0–0.2)
BASOPHILS NFR BLD: 1.1 %
BILIRUB SERPL-MCNC: 0.3 MG/DL (ref 0–1)
BUN SERPL-MCNC: 13 MG/DL (ref 7–20)
CALCIUM SERPL-MCNC: 9.3 MG/DL (ref 8.3–10.6)
CHLORIDE SERPL-SCNC: 105 MMOL/L (ref 99–110)
CHOLEST SERPL-MCNC: 216 MG/DL (ref 0–199)
CO2 SERPL-SCNC: 21 MMOL/L (ref 21–32)
CREAT SERPL-MCNC: 0.6 MG/DL (ref 0.6–1.1)
DEPRECATED RDW RBC AUTO: 14.7 % (ref 12.4–15.4)
EOSINOPHIL # BLD: 0.3 K/UL (ref 0–0.6)
EOSINOPHIL NFR BLD: 4.5 %
EST. AVERAGE GLUCOSE BLD GHB EST-MCNC: 102.5 MG/DL
FOLATE SERPL-MCNC: 7.28 NG/ML (ref 4.78–24.2)
GFR SERPLBLD CREATININE-BSD FMLA CKD-EPI: >90 ML/MIN/{1.73_M2}
GLUCOSE SERPL-MCNC: 97 MG/DL (ref 70–99)
HBA1C MFR BLD: 5.2 %
HCT VFR BLD AUTO: 43.8 % (ref 36–48)
HDLC SERPL-MCNC: 36 MG/DL (ref 40–60)
HGB BLD-MCNC: 14.9 G/DL (ref 12–16)
IRON SATN MFR SERPL: 15 % (ref 15–50)
IRON SERPL-MCNC: 58 UG/DL (ref 37–145)
LDLC SERPL CALC-MCNC: 146 MG/DL
LYMPHOCYTES # BLD: 2.3 K/UL (ref 1–5.1)
LYMPHOCYTES NFR BLD: 38.4 %
MAGNESIUM SERPL-MCNC: 2.01 MG/DL (ref 1.8–2.4)
MCH RBC QN AUTO: 28.6 PG (ref 26–34)
MCHC RBC AUTO-ENTMCNC: 33.9 G/DL (ref 31–36)
MCV RBC AUTO: 84.2 FL (ref 80–100)
MONOCYTES # BLD: 0.3 K/UL (ref 0–1.3)
MONOCYTES NFR BLD: 4.9 %
NEUTROPHILS # BLD: 3.1 K/UL (ref 1.7–7.7)
NEUTROPHILS NFR BLD: 51.1 %
PLATELET # BLD AUTO: 218 K/UL (ref 135–450)
PMV BLD AUTO: 9.8 FL (ref 5–10.5)
POTASSIUM SERPL-SCNC: 4.5 MMOL/L (ref 3.5–5.1)
PROT SERPL-MCNC: 6.7 G/DL (ref 6.4–8.2)
PTH-INTACT SERPL-MCNC: 53.2 PG/ML (ref 14–72)
RBC # BLD AUTO: 5.21 M/UL (ref 4–5.2)
SODIUM SERPL-SCNC: 137 MMOL/L (ref 136–145)
TIBC SERPL-MCNC: 378 UG/DL (ref 260–445)
TRIGL SERPL-MCNC: 171 MG/DL (ref 0–150)
TSH SERPL DL<=0.005 MIU/L-ACNC: 2.51 UIU/ML (ref 0.27–4.2)
VIT B12 SERPL-MCNC: 200 PG/ML (ref 211–911)
VLDLC SERPL CALC-MCNC: 34 MG/DL
WBC # BLD AUTO: 6.1 K/UL (ref 4–11)

## 2025-08-22 LAB — ZINC SERPL-MCNC: 81.1 UG/DL (ref 60–120)

## 2025-08-24 LAB — VIT B1 BLD-MCNC: 128 NMOL/L (ref 70–180)

## 2025-09-04 ENCOUNTER — OFFICE VISIT (OUTPATIENT)
Dept: BARIATRICS/WEIGHT MGMT | Age: 25
End: 2025-09-04
Payer: MEDICAID

## 2025-09-04 VITALS
HEART RATE: 79 BPM | SYSTOLIC BLOOD PRESSURE: 124 MMHG | DIASTOLIC BLOOD PRESSURE: 82 MMHG | WEIGHT: 293 LBS | BODY MASS INDEX: 48.82 KG/M2 | OXYGEN SATURATION: 96 % | HEIGHT: 65 IN

## 2025-09-04 DIAGNOSIS — Z76.89 ENCOUNTER TO ESTABLISH CARE: ICD-10-CM

## 2025-09-04 DIAGNOSIS — E55.9 VITAMIN D DEFICIENCY: ICD-10-CM

## 2025-09-04 DIAGNOSIS — E66.01 MORBID OBESITY (HCC): Primary | ICD-10-CM

## 2025-09-04 DIAGNOSIS — Z01.818 PREOPERATIVE CLEARANCE: ICD-10-CM

## 2025-09-04 PROCEDURE — G2211 COMPLEX E/M VISIT ADD ON: HCPCS | Performed by: SURGERY

## 2025-09-04 PROCEDURE — 99215 OFFICE O/P EST HI 40 MIN: CPT | Performed by: SURGERY

## 2025-09-04 RX ORDER — ERGOCALCIFEROL 1.25 MG/1
50000 CAPSULE, LIQUID FILLED ORAL WEEKLY
Qty: 12 CAPSULE | Refills: 1 | Status: SHIPPED | OUTPATIENT
Start: 2025-09-04

## 2025-09-04 ASSESSMENT — ENCOUNTER SYMPTOMS
RESPIRATORY NEGATIVE: 1
GASTROINTESTINAL NEGATIVE: 1
EYES NEGATIVE: 1